# Patient Record
Sex: MALE | Race: BLACK OR AFRICAN AMERICAN | URBAN - METROPOLITAN AREA
[De-identification: names, ages, dates, MRNs, and addresses within clinical notes are randomized per-mention and may not be internally consistent; named-entity substitution may affect disease eponyms.]

---

## 2023-05-18 ENCOUNTER — TELEPHONE (OUTPATIENT)
Age: 33
End: 2023-05-18

## 2023-05-18 NOTE — TELEPHONE ENCOUNTER
CRISTAL on 05/17/2023 at 10:21am for patient to call the office to schedule a sleep consult per Dr. Debbie Yeh.

## 2023-07-19 ENCOUNTER — CLINICAL DOCUMENTATION (OUTPATIENT)
Age: 33
End: 2023-07-19

## 2023-07-19 ENCOUNTER — OFFICE VISIT (OUTPATIENT)
Age: 33
End: 2023-07-19
Payer: COMMERCIAL

## 2023-07-19 VITALS
HEART RATE: 88 BPM | SYSTOLIC BLOOD PRESSURE: 132 MMHG | WEIGHT: 249 LBS | DIASTOLIC BLOOD PRESSURE: 76 MMHG | BODY MASS INDEX: 37.74 KG/M2 | OXYGEN SATURATION: 91 % | TEMPERATURE: 99.1 F | HEIGHT: 68 IN

## 2023-07-19 DIAGNOSIS — G47.33 OSA (OBSTRUCTIVE SLEEP APNEA): Primary | ICD-10-CM

## 2023-07-19 PROCEDURE — 99203 OFFICE O/P NEW LOW 30 MIN: CPT | Performed by: INTERNAL MEDICINE

## 2023-07-19 RX ORDER — ESOMEPRAZOLE MAGNESIUM 20 MG/1
20 FOR SUSPENSION ORAL DAILY
COMMUNITY

## 2023-07-19 ASSESSMENT — SLEEP AND FATIGUE QUESTIONNAIRES
HOW LIKELY ARE YOU TO NOD OFF OR FALL ASLEEP IN A CAR, WHILE STOPPED FOR A FEW MINUTES IN TRAFFIC: 0
HOW LIKELY ARE YOU TO NOD OFF OR FALL ASLEEP WHILE WATCHING TV: 2
HOW LIKELY ARE YOU TO NOD OFF OR FALL ASLEEP WHILE SITTING INACTIVE IN A PUBLIC PLACE: 0
ESS TOTAL SCORE: 8
HOW LIKELY ARE YOU TO NOD OFF OR FALL ASLEEP WHILE SITTING QUIETLY AFTER LUNCH WITHOUT ALCOHOL: 1
HOW LIKELY ARE YOU TO NOD OFF OR FALL ASLEEP WHEN YOU ARE A PASSENGER IN A CAR FOR AN HOUR WITHOUT A BREAK: 1
HOW LIKELY ARE YOU TO NOD OFF OR FALL ASLEEP WHILE SITTING AND TALKING TO SOMEONE: 0
HOW LIKELY ARE YOU TO NOD OFF OR FALL ASLEEP WHILE SITTING AND READING: 2
HOW LIKELY ARE YOU TO NOD OFF OR FALL ASLEEP WHILE LYING DOWN TO REST IN THE AFTERNOON WHEN CIRCUMSTANCES PERMIT: 2

## 2023-07-19 NOTE — PATIENT INSTRUCTIONS
book. Well before bedtime, write down your worries, and then set the book and your concerns aside. Try meditation or other relaxation techniques before you go to bed. If you cannot fall asleep, get up and go to another room until you feel sleepy. Do something relaxing. Repeat your bedtime routine before you go to bed again. Make your house quiet and calm about an hour before bedtime. Turn down the lights, turn off the TV, log off the computer, and turn down the volume on music. This can help you relax after a busy day. Drowsy Driving  The [address] [place] [address] Traffic Safety Administration cites drowsiness as a causing factor in more than 523,282 police reported crashes annually, resulting in 76,000 injuries and 1,500 deaths. Other surveys suggest 55% of people polled have driven while drowsy in the past year, 23% had fallen asleep but not crashed, 3% crashed, and 2% had and accident due to drowsy driving. Who is at risk? Young Drivers: One study of drowsy driving accidents states that 55% of the drivers were under 25 years. Of those, 75% were male. Shift Workers and Travelers: People who work overnight or travel across time zones frequently are at higher risk of experiencing Circadian Rhythm Disorders. They are trying to work and function when their body is programed to sleep. Sleep Deprived: Lack of sleep has a serious impact on your ability to pay attention or focus on a task. Consistently getting less than the average of 8 hours your body needs creates partial or cumulative sleep deprivation. Untreated Sleep Disorders: Sleep Apnea, Narcolepsy, R.L.S., and other sleep disorders (untreated) prevent a person from getting enough restful sleep. This leads to excessive daytime sleepiness and increases the risk for drowsy driving accidents by up to 7 times. Medications / Alcohol: Even over the counter medications can cause drowsiness.  Medications that impair a drivers attention should have a warning

## 2023-07-19 NOTE — PROGRESS NOTES
[address] [place], [address]  Tel.  [phone]    Fax. [phone] [facility], [facility] [place]  Tel.  [phone]    Fax. [phone]     [facility], [address] [facility]  Tel.  [phone]    Fax. [phone]       Emi Menon is a 28y.o. year old male referred by Dr. Fredrick Addison  for evaluation of a sleep disorder. ASSESSMENT/PLAN:     Diagnosis Orders   1. HIRAL (obstructive sleep apnea)  SLEEP STUDY UNATTENDED, 4 CHANNEL      2. BMI 37.0-37.9, adult            Patient has a history and examination consistent with the diagnosis of sleep apnea. Return for for follow-up after testing is completed. * The patient currently has a Moderate Risk for having sleep apnea. STOP-BANG score 5.    * Sleep testing was ordered for initial evaluation. Orders Placed This Encounter   Procedures    SLEEP STUDY UNATTENDED, 4 CHANNEL     Standing Status:   Future     Standing Expiration Date:   7/19/2024       * He was provided information on sleep apnea including corresponding risk factors and the importance of proper treatment. * Treatment options were reviewed in detail. he would like to proceed with PAP therapy. Patient will be seen in follow-up in 6-8 weeks after PAP setup to gauge treatment response and adherence to therapy. * The patient was counseled regarding proper sleep hygiene, with emphasis on ensuring sufficient total sleep time; safe driving and the benefits of exercise and weight loss. * All of his questions were addressed. 2. Recommended a dedicated weight loss program through appropriate diet and exercise regimen as significant weight reduction has been shown to reduce severity of obstructive sleep apnea. SUBJECTIVE/OBJECTIVE:    Emi Menon is an 28 y.o. male referred for evaluation for a sleep disorder.  He complains of

## 2023-08-30 ENCOUNTER — HOSPITAL ENCOUNTER (OUTPATIENT)
Facility: HOSPITAL | Age: 33
Discharge: HOME | End: 2023-09-02
Payer: COMMERCIAL

## 2023-08-30 ENCOUNTER — PROCEDURE VISIT (OUTPATIENT)
Age: 33
End: 2023-08-30

## 2023-08-30 DIAGNOSIS — G47.33 OSA (OBSTRUCTIVE SLEEP APNEA): Primary | ICD-10-CM

## 2023-08-30 DIAGNOSIS — G47.33 OSA (OBSTRUCTIVE SLEEP APNEA): ICD-10-CM

## 2023-08-30 PROCEDURE — G0400 HOME SLEEP TEST/TYPE 4 PORTA: HCPCS | Performed by: INTERNAL MEDICINE

## 2023-09-05 ENCOUNTER — TELEPHONE (OUTPATIENT)
Age: 33
End: 2023-09-05

## 2023-09-05 DIAGNOSIS — G47.33 OSA (OBSTRUCTIVE SLEEP APNEA): Primary | ICD-10-CM

## 2023-09-07 NOTE — PROGRESS NOTES
[address] [facility] [place], [address]  Tel.  [phone]  Fax. [address] [facility], [address] [place] [address]  Tel.  [phone]  Fax. [phone] [facility], [address] [facility]  Tel.  [phone]  Fax. [phone]       Isha Rodrigeuz is seen today to receive a WatchPAT home sleep apnea testing (HSAT) device. The WatchPAT HSAT Agreement was reviewed and endorsed by patient. General information regarding operation of the HSAT device was provided. Patient was advised to watch the [facility] instructional video. Patient was advised to contact patient support for any problems using the device. Patient was advised to complete the Morning Questionnaire after awakening/ending the test.    There were no vitals taken for this visit. Patient will return the home sleep testing unit by noon unless otherwise approved. Patient will be contacted once the results have been reviewed by the physician, typically within 10-15 business days.     [email or web address] Serial Number [id number]

## 2023-09-08 NOTE — TELEPHONE ENCOUNTER
Polly Phillips is to be contacted by sleep technologists regarding results of WatchPAT Testing which was indicative of an average pAHI 4% of 73.9 per hour and pAHI 3% of 76.7. SpO2 elizabeth was 57% and SpO2 of < 88% was 181.9 minutes. An APAP prescription has been written and patient will be contacted by office staff regarding follow-up  in 2-3 months after initiation of therapy. Encounter Diagnosis   Name Primary? HIRAL (obstructive sleep apnea) Yes       Orders Placed This Encounter   Procedures    DME Order for (Specify) as OP     - DME device ordered - ResMed Device with Heated Humidifer  / .   - Diagnosis: Obstructive Sleep Apnea (G47.33)  - Length of Need: Lifetime    Pressure Settin - 15 cmH2O     Nasal Cushion (Replace) 2 per month.  Nasal Interface Mask 1 every 3 months.  Headgear 1 every 6 months.  Filter(s) Disposable 2 per month.  Filter(s) Non-Disposable 1 every 6 months. [address]  for Nena Markham (Replace) 1 every 6 months.  Tubing with heating element 1 every 3 months. Perform Mask Fitting per patient preference and comfort - replace as above. Martina Michael MD, [facility]; NPI: [id number]  Electronically signed.  2023

## 2023-09-11 ENCOUNTER — CLINICAL DOCUMENTATION (OUTPATIENT)
Age: 33
End: 2023-09-11

## 2023-10-05 ENCOUNTER — TELEPHONE (OUTPATIENT)
Age: 33
End: 2023-10-05

## 2023-10-05 NOTE — TELEPHONE ENCOUNTER
Received a fax from Radha Frost stating patient needs to schedule a follow up appointment between 11/4/2023-1/2/2024. Left the patient a voicemail to give the office a call to schedule this appointment.